# Patient Record
Sex: MALE | Race: WHITE
[De-identification: names, ages, dates, MRNs, and addresses within clinical notes are randomized per-mention and may not be internally consistent; named-entity substitution may affect disease eponyms.]

---

## 2020-05-05 ENCOUNTER — HOSPITAL ENCOUNTER (INPATIENT)
Dept: HOSPITAL 4 - SED | Age: 65
LOS: 2 days | Discharge: ANOTHER HOSPITAL | DRG: 194 | End: 2020-05-07
Attending: INTERNAL MEDICINE | Admitting: INTERNAL MEDICINE
Payer: MEDICARE

## 2020-05-05 VITALS — HEIGHT: 65 IN | WEIGHT: 190 LBS | BODY MASS INDEX: 31.65 KG/M2

## 2020-05-05 VITALS — SYSTOLIC BLOOD PRESSURE: 138 MMHG

## 2020-05-05 DIAGNOSIS — Z79.899: ICD-10-CM

## 2020-05-05 DIAGNOSIS — D72.810: ICD-10-CM

## 2020-05-05 DIAGNOSIS — F11.20: ICD-10-CM

## 2020-05-05 DIAGNOSIS — C22.9: ICD-10-CM

## 2020-05-05 DIAGNOSIS — Z20.828: ICD-10-CM

## 2020-05-05 DIAGNOSIS — J18.9: Primary | ICD-10-CM

## 2020-05-05 DIAGNOSIS — F17.210: ICD-10-CM

## 2020-05-05 DIAGNOSIS — I10: ICD-10-CM

## 2020-05-05 LAB
ALBUMIN SERPL BCP-MCNC: 3.8 G/DL (ref 3.4–4.8)
ALT SERPL W P-5'-P-CCNC: 62 U/L (ref 12–78)
ANION GAP SERPL CALCULATED.3IONS-SCNC: 8 MMOL/L (ref 5–15)
APPEARANCE UR: CLEAR
AST SERPL W P-5'-P-CCNC: 28 U/L (ref 10–37)
BASOPHILS # BLD AUTO: 0 K/UL (ref 0–0.2)
BASOPHILS NFR BLD AUTO: 0.5 % (ref 0–2)
BILIRUB SERPL-MCNC: 1.3 MG/DL (ref 0–1)
BILIRUB UR QL STRIP: NEGATIVE
BUN SERPL-MCNC: 14 MG/DL (ref 8–21)
CALCIUM SERPL-MCNC: 8.8 MG/DL (ref 8.4–11)
CHLORIDE SERPL-SCNC: 97 MMOL/L (ref 98–107)
COLOR UR: YELLOW
CREAT SERPL-MCNC: 0.91 MG/DL (ref 0.55–1.3)
CRP SERPL-MCNC: 3.4 MG/DL (ref 0–0.5)
EOSINOPHIL # BLD AUTO: 0.1 K/UL (ref 0–0.4)
EOSINOPHIL NFR BLD AUTO: 1.7 % (ref 0–4)
ERYTHROCYTE [DISTWIDTH] IN BLOOD BY AUTOMATED COUNT: 13.9 % (ref 9–15)
FIBRINOGEN PPP-MCNC: 368 MG/DL (ref 200–400)
GFR SERPL CREATININE-BSD FRML MDRD: 108 ML/MIN (ref 90–?)
GLUCOSE SERPL-MCNC: 102 MG/DL (ref 70–99)
GLUCOSE UR STRIP-MCNC: NEGATIVE MG/DL
HCT VFR BLD AUTO: 37.7 % (ref 36–54)
HGB BLD-MCNC: 12.7 G/DL (ref 14–18)
HGB UR QL STRIP: NEGATIVE
INR PPP: 1 (ref 0.8–1.2)
KETONES UR STRIP-MCNC: NEGATIVE MG/DL
LACTATE SERPL-SCNC: 199 U/L (ref 85–227)
LEUKOCYTE ESTERASE UR QL STRIP: NEGATIVE
LYMPHOCYTES # BLD AUTO: 0.9 K/UL (ref 1–5.5)
LYMPHOCYTES NFR BLD AUTO: 16.2 % (ref 20.5–51.5)
MCH RBC QN AUTO: 29 PG (ref 27–31)
MCHC RBC AUTO-ENTMCNC: 34 % (ref 32–36)
MCV RBC AUTO: 87 FL (ref 79–98)
MONOCYTES # BLD MANUAL: 0.8 K/UL (ref 0–1)
MONOCYTES # BLD MANUAL: 13 % (ref 1.7–9.3)
NEUTROPHILS # BLD AUTO: 4 K/UL (ref 1.8–7.7)
NEUTROPHILS NFR BLD AUTO: 68.6 % (ref 40–70)
NITRITE UR QL STRIP: NEGATIVE
PH UR STRIP: 6 [PH] (ref 5–8)
PLATELET # BLD AUTO: 149 K/UL (ref 130–430)
POTASSIUM SERPL-SCNC: 3.9 MMOL/L (ref 3.5–5.1)
PROT UR QL STRIP: NEGATIVE
PROTHROMBIN TIME: 10.4 SECS (ref 9.5–12.5)
RBC # BLD AUTO: 4.33 MIL/UL (ref 4.2–6.2)
SODIUM SERPLBLD-SCNC: 132 MMOL/L (ref 136–145)
SP GR UR STRIP: 1.02 (ref 1–1.03)
UROBILINOGEN UR STRIP-MCNC: 0.2 MG/DL (ref 0.2–1)
WBC # BLD AUTO: 5.8 K/UL (ref 4.8–10.8)

## 2020-05-05 PROCEDURE — G0378 HOSPITAL OBSERVATION PER HR: HCPCS

## 2020-05-05 PROCEDURE — U0002 COVID-19 LAB TEST NON-CDC: HCPCS

## 2020-05-05 NOTE — NUR
# 18 gauge angiocath placed to RFA.  Use of asceptic technique.  Opsite placed 
over site.  Blood return noted.  Blood, blood culture, and lactic for lab drawn 
from site.  Flushed with 10 cc of normal saline.  No evidence of infiltration 
noted.  Patient tolerated well.

## 2020-05-05 NOTE — NUR
Patient triaged and placed in tent area. VSS and patient appears in no acute 
distress at this time and MD notified of need for MSE.

## 2020-05-05 NOTE — NUR
Medication reconciliation completed with information provided by Cheyenneiser 
Anette. Any prior medication reconciliation on file was reviewed and 
corrected.

## 2020-05-05 NOTE — NUR
Patient alert and oriented x4 complaining of shortness of breath, left sided 
chest pain that started on 4/29/20 after his liver surgery. Patient states he 
came into today because his chest pain worsened and he woke up this morning at 
230am with night sweats and a fever. His pain is rated at 5 out of 10 and feels 
sharp and worsens when he moves around. Patient arrived at the ER today with a 
fever of 100.3. Patient has a history of liver cirrhosis and liver cancer. 
Patient was tested for COVID 19 and received results negative on monday night. 
Patient denies cough. No other medical complaints at this time. Will continue 
to monitor.

## 2020-05-05 NOTE — NUR
Patient will be admitted to care of Dr. Dietrich.  Admitted to tele unit.  Will go 
to room 126B.  Belongings list completed.  Complete and up to date summary 
report printed. SBAR report to be given at bedside with opportunity for 
questions.

## 2020-05-06 VITALS — SYSTOLIC BLOOD PRESSURE: 150 MMHG

## 2020-05-06 VITALS — SYSTOLIC BLOOD PRESSURE: 144 MMHG

## 2020-05-06 VITALS — SYSTOLIC BLOOD PRESSURE: 141 MMHG

## 2020-05-06 LAB
ALBUMIN SERPL BCP-MCNC: 3.5 G/DL (ref 3.4–4.8)
ALT SERPL W P-5'-P-CCNC: 56 U/L (ref 12–78)
ANION GAP SERPL CALCULATED.3IONS-SCNC: 6 MMOL/L (ref 5–15)
AST SERPL W P-5'-P-CCNC: 25 U/L (ref 10–37)
BASOPHILS # BLD AUTO: 0 K/UL (ref 0–0.2)
BASOPHILS NFR BLD AUTO: 0.5 % (ref 0–2)
BILIRUB SERPL-MCNC: 1.6 MG/DL (ref 0–1)
BUN SERPL-MCNC: 13 MG/DL (ref 8–21)
CALCIUM SERPL-MCNC: 8.9 MG/DL (ref 8.4–11)
CHLORIDE SERPL-SCNC: 102 MMOL/L (ref 98–107)
CREAT SERPL-MCNC: 0.99 MG/DL (ref 0.55–1.3)
EOSINOPHIL # BLD AUTO: 0.1 K/UL (ref 0–0.4)
EOSINOPHIL NFR BLD AUTO: 1.3 % (ref 0–4)
ERYTHROCYTE [DISTWIDTH] IN BLOOD BY AUTOMATED COUNT: 14.1 % (ref 9–15)
ERYTHROCYTE [SEDIMENTATION RATE] IN BLOOD BY WESTERGREN METHOD: 18 MM/HR (ref 0–15)
GFR SERPL CREATININE-BSD FRML MDRD: 98 ML/MIN (ref 90–?)
GLUCOSE SERPL-MCNC: 98 MG/DL (ref 70–99)
HCT VFR BLD AUTO: 38.2 % (ref 36–54)
HGB BLD-MCNC: 12.9 G/DL (ref 14–18)
LYMPHOCYTES # BLD AUTO: 0.6 K/UL (ref 1–5.5)
LYMPHOCYTES NFR BLD AUTO: 12.2 % (ref 20.5–51.5)
MCH RBC QN AUTO: 29 PG (ref 27–31)
MCHC RBC AUTO-ENTMCNC: 34 % (ref 32–36)
MCV RBC AUTO: 87 FL (ref 79–98)
MONOCYTES # BLD MANUAL: 0.8 K/UL (ref 0–1)
MONOCYTES # BLD MANUAL: 17.4 % (ref 1.7–9.3)
NEUTROPHILS # BLD AUTO: 3.1 K/UL (ref 1.8–7.7)
NEUTROPHILS NFR BLD AUTO: 68.6 % (ref 40–70)
PLATELET # BLD AUTO: 133 K/UL (ref 130–430)
POTASSIUM SERPL-SCNC: 4.1 MMOL/L (ref 3.5–5.1)
RBC # BLD AUTO: 4.39 MIL/UL (ref 4.2–6.2)
SODIUM SERPLBLD-SCNC: 137 MMOL/L (ref 136–145)
TSH SERPL DL<=0.05 MIU/L-ACNC: 1.25 UIU/ML (ref 0.34–4.82)
WBC # BLD AUTO: 4.6 K/UL (ref 4.8–10.8)

## 2020-05-06 RX ADMIN — Medication SCH MG: at 08:35

## 2020-05-06 RX ADMIN — FAMOTIDINE SCH MG: 20 TABLET, FILM COATED ORAL at 08:35

## 2020-05-06 RX ADMIN — LOSARTAN POTASSIUM SCH MG: 50 TABLET, FILM COATED ORAL at 08:35

## 2020-05-06 NOTE — NUR
Case mgt: S/W juan manuel Mares from [place]--per charge HARVEY Irwin, pt/wife indicated pt had a negative 
Covid test at [facility] a few days ago. Vishnu faxed me those results, which were taken to charge 
HARVEY Irwin-Alida said she will call Dr. Dietrich with those results and f/u for transfer order, and 
Alida indicates pt/wife agreeable to transfer to [facility].  RN

## 2020-05-06 NOTE — NUR
Transfer to tele via ACLS protocol. Licensed nurse present. IV present no signs 
or symptoms of infiltration.

## 2020-05-06 NOTE — NUR
SPOKE WITH DR. DIETRICH:

Spoke with Dr. Dietrich regarding the dosage of the medication from home the patient takes.  
New orders were made. Also let MD patient was asking to shower, MD said that was fine and 
patient could be D/C from telemetry.  Will carry out all new orders accordingly.

## 2020-05-06 NOTE — NUR
Case mgt: Rec'd a call from Dayo Mares  at [phone] requesting clinical 
update-Vishnu is aware pt is Covid-pending results status in isolation and asked if pt is 
stable for transfer to contracted hospital-if so, ask if pt is agreeable for transfer and 
fax transfer order to him at [phone]--I s/w charge HARVEY Irwin about this information-She 
will call  Dr. Dietrich and find out if pt is agreeable for transfer to contracted 
hospital--radiology is making CD and I will prepare transfer packet for nursing unit--DH RN

## 2020-05-06 NOTE — NUR
Initial RN notes



Received pt from ED via christy.  Pt AAOx4, VSS, afrebrile.  O2 sat 94-95% on room air.  No 
acute distress. Pt denies any chest pain but c/o slight sob.  Abd scars noted and R. lower 
abd bruise noted.  IV saline lock R. FA 18G good blood return.  Oriented to room/call light 
use, pt verbalized understanding.  CAll light within reach.  Bed low, locked, siderails up 
x2.  To monitor.

## 2020-05-06 NOTE — NUR
ON PHONE WITH FRIEND:

Patient is currently sitting in a chair at bedside on the phone with his friend.  No acute 
distress noted.  No new needs at this time.  Will continue to monitor. 

-------------------------------------------------------------------------------

Addendum: 05/07/20 at 0304 by Tanya Israel RN

-------------------------------------------------------------------------------

entered on the wrong date

## 2020-05-06 NOTE — NUR
CONSULTATION PAGED/CALLED

Reason for Consultation: FEVER,PNA,R/O COVID

Person Who was Notified:  

Consulting Physician:   

Consultant Specialty: 

Ordering Physician:

## 2020-05-06 NOTE — NUR
Case mgt: Faxed transfer to [facility] hospital order to Dayo ORELLANA fax#[phone]--Vishnu demarco [facility] at [phone]--Per Vishnu, [facility] cannot take pt until our 
Covid-19 testing is completed/resulted--Vishnu has MST ph# for f/u--SHREYA GABRIEL

-------------------------------------------------------------------------------

Addendum: 05/06/20 at 1530 by Erika Shook RN

-------------------------------------------------------------------------------

Transfer packet placed at nursing station with xray CD-DH RN

## 2020-05-06 NOTE — NUR
SUPPLIED PATIENT WITH BATHING/HYGIENE SUPPLIES:

Patient was given everything he needed to shower.  Patient is independent and does not need 
assistance showering.  Patient has no new needs at this time.  Will continue to monitor.

## 2020-05-06 NOTE — NUR
PATIENT AGGRESSIVE/UPSET/EDUCATED PATIENT ON DIRECTIONS OF MEDICATION/MOVED ROOMS:

Patient's wife called and spoke to me about the condition of the patient's room stating 
their had been leak and she was concerned about the patient's safety.  She also mentioned 
that patient needed his home medication which she brought during the dayshift at 1200 and 
was concerned because the patient is on withdrawal from opioids.  During change of shift it 
was reported to me that patient had asked during dayshift to be put in a different room, at 
this time, my charge nurse had let me know that accommodations had been made to move the 
patient to another room. I hung up with the wife and headed over to the patient's room.  
When the door was opened the patient yelled "Get out of here" and slammed the door on 
HARVEY Navarro.  I then went to the call light phone and tried speaking with the patient to ask 
if he was okay to move rooms at this time.  Patient started cussing and threatening to get 
violent if he did not change rooms or receive his medication.  Security was called.  
Security and myself went back to the patient's room to try and de-escalate the situation.  
The patient was still very upset and stated "All I want is my medication."  I retrieved the 
medication for the patient with the ordered dose and the patient then stated "I have been 
taking this medication for 3 years, I only talk a quarter of the tablet 4x a day to 
equivalent one pill for the whole day" I then read and educated the patient of the dose that 
had been on the bottle, however, he refused to take the ordered dose and cut the pill 
himself and took it.  He then was more calm and was willing to move rooms.  We transfered 
the patient to a new room and he then apologized for his behavior. Patient was no longer 
aggressive or very upset.  I met all his needs at this time and told him I would be back to 
check in on him again.

## 2020-05-06 NOTE — NUR
ADMISSION:

The patient, CHULA WISE, 65 y/o, M admitted by ARNULFO DICKSON MD, was given written 
information regarding hospital policies, unit procedures and contact persons.  



Valuables were checked and .


-------------------------------------------------------------------------------

Addendum: 05/06/20 at 0122 by Anai Umanzor RN

-------------------------------------------------------------------------------

Disregard above notes.

## 2020-05-06 NOTE — NUR
PATIENT SLEEPING VERBALIZED HE IS VERY TIRED. METHADONE BROUGHT TO PHARMACY. AWAITING FOR 
THE METHADONE TO BE FIXED BY PHARMACY. FOLLOW UP

## 2020-05-06 NOTE — NUR
ADMISSION NOTE



Received patient from ER via christy, received report from Perri GABRIEL. Patient admitted with 
diagnosis of PNEUMONIA, FEVER R/O COVID. Patient oriented to hospital routine, call light, 
toileting and safety-patient verbalized understanding. ADMIT NOTE

Received pt from ER to the floor with a diagnosis of []. Admission process initiated. 
patient oriented to pain management, safety and call light-teach back done.

## 2020-05-06 NOTE — NUR
OPENING NOTE:

Patient is awake, AOx4.  No acute distress noted.  Breathing is even and unlabored.  IV site 
is patent without signs of infection or infiltration.  Bed alarm is locked in lowest 
position, bed alarm not indicated as patient is ambulatory with a steady gait.  Call light 
with patient.  Patient has been educated on importance and use of call light.  Patient 
verbalized understand and demonstrated proper use.  Will continue to monitor.

## 2020-05-06 NOTE — NUR
Pagenidia Dietrich for Nurse Tanya.

-------------------------------------------------------------------------------

Addendum: 05/06/20 at 2233 by Melanie Chakraborty RN

-------------------------------------------------------------------------------

Left message with [facility].

## 2020-05-06 NOTE — NUR
Case mgt:Attempted to call pt's room for dcpa-no answer--pt Covid test pending--I called 

pt's wife to ask about ADLs-Per Diane, pt is independent with ADLs, drives 

a car--Diane aware pt may be transferred to [facility], 

once [facility] has a bed for pt--she indicates pt is aware of this--DH RN

## 2020-05-06 NOTE — NUR
RECEIVED PATIENT AAOX 4. VITALS SIGNS STABLE. AFEBRILE LUNGS BILATERALLY CLEAR. ABDOMEN SOFT 
AND NON DISTENDED. AMBULATORY GOES TO THE BATHROOM. WILL CONTINUE TO MONITOR PATIENTS 
STATUS. HAS IV ACCESS ON THE RT FOREARM #22. SALINE LOCK. BED LOW POSITION, ALARMED AND 
LOCKED. WILL CONTINUE TO MONITOR PATIENTS STATUS.

## 2020-05-07 VITALS — SYSTOLIC BLOOD PRESSURE: 136 MMHG

## 2020-05-07 VITALS — SYSTOLIC BLOOD PRESSURE: 135 MMHG

## 2020-05-07 LAB
ALBUMIN SERPL BCP-MCNC: 3.7 G/DL (ref 3.4–4.8)
ALT SERPL W P-5'-P-CCNC: 54 U/L (ref 12–78)
ANION GAP SERPL CALCULATED.3IONS-SCNC: 5 MMOL/L (ref 5–15)
AST SERPL W P-5'-P-CCNC: 22 U/L (ref 10–37)
BASOPHILS # BLD AUTO: 0 K/UL (ref 0–0.2)
BASOPHILS NFR BLD AUTO: 0.6 % (ref 0–2)
BILIRUB SERPL-MCNC: 1.1 MG/DL (ref 0–1)
BUN SERPL-MCNC: 22 MG/DL (ref 8–21)
CALCIUM SERPL-MCNC: 9.5 MG/DL (ref 8.4–11)
CHLORIDE SERPL-SCNC: 100 MMOL/L (ref 98–107)
CREAT SERPL-MCNC: 0.98 MG/DL (ref 0.55–1.3)
EOSINOPHIL # BLD AUTO: 0.1 K/UL (ref 0–0.4)
EOSINOPHIL NFR BLD AUTO: 1.6 % (ref 0–4)
ERYTHROCYTE [DISTWIDTH] IN BLOOD BY AUTOMATED COUNT: 14.1 % (ref 9–15)
GFR SERPL CREATININE-BSD FRML MDRD: 99 ML/MIN (ref 90–?)
GLUCOSE SERPL-MCNC: 99 MG/DL (ref 70–99)
HCT VFR BLD AUTO: 38.9 % (ref 36–54)
HGB BLD-MCNC: 13.4 G/DL (ref 14–18)
LYMPHOCYTES # BLD AUTO: 1 K/UL (ref 1–5.5)
LYMPHOCYTES NFR BLD AUTO: 19.3 % (ref 20.5–51.5)
MCH RBC QN AUTO: 30 PG (ref 27–31)
MCHC RBC AUTO-ENTMCNC: 34 % (ref 32–36)
MCV RBC AUTO: 87 FL (ref 79–98)
MONOCYTES # BLD MANUAL: 0.8 K/UL (ref 0–1)
MONOCYTES # BLD MANUAL: 14.5 % (ref 1.7–9.3)
NEUTROPHILS # BLD AUTO: 3.5 K/UL (ref 1.8–7.7)
NEUTROPHILS NFR BLD AUTO: 64 % (ref 40–70)
PLATELET # BLD AUTO: 183 K/UL (ref 130–430)
POTASSIUM SERPL-SCNC: 3.8 MMOL/L (ref 3.5–5.1)
RBC # BLD AUTO: 4.48 MIL/UL (ref 4.2–6.2)
SODIUM SERPLBLD-SCNC: 131 MMOL/L (ref 136–145)
WBC # BLD AUTO: 5.4 K/UL (ref 4.8–10.8)

## 2020-05-07 RX ADMIN — FAMOTIDINE SCH MG: 20 TABLET, FILM COATED ORAL at 08:41

## 2020-05-07 RX ADMIN — Medication SCH MG: at 08:43

## 2020-05-07 RX ADMIN — LOSARTAN POTASSIUM SCH MG: 50 TABLET, FILM COATED ORAL at 08:42

## 2020-05-07 NOTE — NUR
DC PLANNING

Called & spoke with Miguel @ [facility], ph [phone], CM assigned is Vishnu, on phone. 
Left msg Covid results neg. Vishnu Oshea CM, ph [phone].

## 2020-05-07 NOTE — NUR
ON PHONE WITH FRIEND:

Patient is currently sitting in a chair at bedside on the phone with his friend.  No acute 
distress noted.  No new needs at this time.  Will continue to monitor.

## 2020-05-07 NOTE — NUR
opening note

bedside sbar from night RN, patient is in bed, awake, alert, respirations even, non labored, 
bed in low and locked position, call light within reach

## 2020-05-07 NOTE — NUR
ROUNDS:

Patient resting at this time.  Breathing is even and unlabored.  Bed locked in lowest 
position, call light with patient.  Will continue to monitor.

## 2020-05-07 NOTE — NUR
nurse notes

patient in bed eyes closed, respirations even, non labored, bed in low and locked position, 
call light within reach

## 2020-05-07 NOTE — NUR
CLOSING NOTE:

Patient is awake, AOx4.  No acute distress noted.  Breathing is even and unlabored.  IV site 
is patent without signs of infection or infiltration.  Awaiting call back from MD Perea to let 
him know about patient negative COVID results.  All fall/safety/isolation precautions 
maintained throughout the shift.  All needs met throughout the shift. Will continue to 
monitor until endorsement of care to dayshift nurse.

## 2020-05-07 NOTE — NUR
PT TRANSFERRED 

Report given to Na at [facility]. Transfer packet with Transfer Orders and 
Medication Reconciliation form given to EMT with report. Exitcare provided. [facility] ID band 
removed, replaced with ID band with pt's name and .  All belongings sent with patient. 
Patient left floor via gurney escorted by EMT in no distress.

## 2020-05-07 NOTE — NUR
SNACKS PROVIDED:

Patient asked for snacks, snacks provided at this time.  No other needs.  Will continue to 
monitor.

## 2020-05-07 NOTE — NUR
DC PLANNING

Received call back from Vishnu @ [facility], wanted another transfer order from today faxed. 
Received order, faxed transfer order to Vishnu along with Covid neg results, MAR, labs from 
today. Has Dr Dietrich's cell & office ph, nsg station direct #. Left msg with pt's nurse, 
Charge nurse aware. Called & spoke with pt & updated, agreeable with transfer wants [facility]. Pt anxious to leave, states if does not leave later today he will go AMA & go 
to [facility] directly. CD ordered. 

-------------------------------------------------------------------------------

Addendum: 05/07/20 at 1148 by Nancy Reyes RN

-------------------------------------------------------------------------------

add to note above, per pt no need to call family he is on his cell phone with his wife & 
will updated her.

## 2022-11-22 ENCOUNTER — HOSPITAL ENCOUNTER (EMERGENCY)
Dept: HOSPITAL 4 - SED | Age: 67
Discharge: LEFT BEFORE DISCHARGE | End: 2022-11-22
Payer: MEDICARE

## 2022-11-22 VITALS — HEIGHT: 66 IN | WEIGHT: 180 LBS | BODY MASS INDEX: 28.93 KG/M2

## 2022-11-22 VITALS — SYSTOLIC BLOOD PRESSURE: 156 MMHG
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
## 2022-11-22 DIAGNOSIS — S11.93XA: Primary | ICD-10-CM

## 2022-11-22 DIAGNOSIS — Z53.21: ICD-10-CM
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
## 2024-08-02 ENCOUNTER — HOSPITAL ENCOUNTER (EMERGENCY)
Dept: HOSPITAL 4 - SED | Age: 69
Discharge: HOME | End: 2024-08-02
Payer: MEDICARE

## 2024-08-02 VITALS
RESPIRATION RATE: 18 BRPM | SYSTOLIC BLOOD PRESSURE: 111 MMHG | HEART RATE: 67 BPM | TEMPERATURE: 98.5 F | OXYGEN SATURATION: 97 %

## 2024-08-02 VITALS
SYSTOLIC BLOOD PRESSURE: 111 MMHG | RESPIRATION RATE: 18 BRPM | HEART RATE: 67 BPM | OXYGEN SATURATION: 97 % | TEMPERATURE: 98.5 F

## 2024-08-02 VITALS — HEIGHT: 64 IN | BODY MASS INDEX: 24.75 KG/M2 | WEIGHT: 145 LBS

## 2024-08-02 DIAGNOSIS — Z79.2: ICD-10-CM

## 2024-08-02 DIAGNOSIS — Z85.05: ICD-10-CM

## 2024-08-02 DIAGNOSIS — R30.0: Primary | ICD-10-CM

## 2024-08-02 DIAGNOSIS — Z79.899: ICD-10-CM

## 2024-08-02 DIAGNOSIS — I10: ICD-10-CM

## 2024-08-02 LAB
APPEARANCE UR: CLEAR
BILIRUB UR QL STRIP: NEGATIVE
COLOR UR: YELLOW
GLUCOSE UR STRIP-MCNC: NEGATIVE MG/DL
HGB UR QL STRIP: NEGATIVE
KETONES UR STRIP-MCNC: NEGATIVE MG/DL
LEUKOCYTE ESTERASE UR QL STRIP: NEGATIVE
NITRITE UR QL STRIP: NEGATIVE
PH UR STRIP: 5.5 [PH] (ref 5–8)
PROT UR QL STRIP: NEGATIVE
SP GR UR STRIP: >=1.03 (ref 1–1.03)
UROBILINOGEN UR STRIP-MCNC: 0.2 MG/DL (ref 0.2–1)